# Patient Record
Sex: MALE | Race: WHITE
[De-identification: names, ages, dates, MRNs, and addresses within clinical notes are randomized per-mention and may not be internally consistent; named-entity substitution may affect disease eponyms.]

---

## 2021-03-14 ENCOUNTER — HOSPITAL ENCOUNTER (EMERGENCY)
Dept: HOSPITAL 7 - FB.ED | Age: 66
Discharge: HOME | End: 2021-03-14
Payer: COMMERCIAL

## 2021-03-14 DIAGNOSIS — S06.0X9A: Primary | ICD-10-CM

## 2021-03-14 DIAGNOSIS — I49.9: ICD-10-CM

## 2021-03-14 DIAGNOSIS — E11.649: ICD-10-CM

## 2021-03-14 DIAGNOSIS — S80.212A: ICD-10-CM

## 2021-03-14 DIAGNOSIS — Z23: ICD-10-CM

## 2021-03-14 DIAGNOSIS — Z79.4: ICD-10-CM

## 2021-03-14 DIAGNOSIS — L08.9: ICD-10-CM
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
## 2021-03-14 DIAGNOSIS — R55: ICD-10-CM

## 2021-03-14 PROCEDURE — 36415 COLL VENOUS BLD VENIPUNCTURE: CPT

## 2021-03-14 PROCEDURE — 84484 ASSAY OF TROPONIN QUANT: CPT

## 2021-03-14 PROCEDURE — 90715 TDAP VACCINE 7 YRS/> IM: CPT

## 2021-03-14 PROCEDURE — 83735 ASSAY OF MAGNESIUM: CPT

## 2021-03-14 PROCEDURE — 90471 IMMUNIZATION ADMIN: CPT

## 2021-03-14 PROCEDURE — 93005 ELECTROCARDIOGRAM TRACING: CPT

## 2021-03-14 PROCEDURE — 71046 X-RAY EXAM CHEST 2 VIEWS: CPT

## 2021-03-14 PROCEDURE — 80053 COMPREHEN METABOLIC PANEL: CPT

## 2021-03-14 PROCEDURE — 85025 COMPLETE CBC W/AUTO DIFF WBC: CPT

## 2021-03-14 PROCEDURE — 70450 CT HEAD/BRAIN W/O DYE: CPT

## 2021-03-14 PROCEDURE — 99285 EMERGENCY DEPT VISIT HI MDM: CPT

## 2021-03-14 PROCEDURE — 93010 ELECTROCARDIOGRAM REPORT: CPT
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
